# Patient Record
Sex: MALE | Race: BLACK OR AFRICAN AMERICAN | Employment: UNEMPLOYED
[De-identification: names, ages, dates, MRNs, and addresses within clinical notes are randomized per-mention and may not be internally consistent; named-entity substitution may affect disease eponyms.]

---

## 2023-10-27 ENCOUNTER — HOSPITAL ENCOUNTER (EMERGENCY)
Facility: HOSPITAL | Age: 28
Discharge: HOME | End: 2023-10-27

## 2023-10-27 VITALS
DIASTOLIC BLOOD PRESSURE: 102 MMHG | RESPIRATION RATE: 16 BRPM | BODY MASS INDEX: 38.57 KG/M2 | TEMPERATURE: 97.7 F | WEIGHT: 240 LBS | HEIGHT: 66 IN | OXYGEN SATURATION: 98 % | SYSTOLIC BLOOD PRESSURE: 164 MMHG | HEART RATE: 100 BPM

## 2023-10-27 DIAGNOSIS — H66.92 ACUTE OTITIS MEDIA WITH PERFORATED TYMPANIC MEMBRANE, LEFT: Primary | ICD-10-CM

## 2023-10-27 DIAGNOSIS — H72.92 ACUTE OTITIS MEDIA WITH PERFORATED TYMPANIC MEMBRANE, LEFT: Primary | ICD-10-CM

## 2023-10-27 PROCEDURE — 99283 EMERGENCY DEPT VISIT LOW MDM: CPT

## 2023-10-27 RX ORDER — AMOXICILLIN AND CLAVULANATE POTASSIUM 875; 125 MG/1; MG/1
1 TABLET, FILM COATED ORAL 2 TIMES DAILY
Qty: 20 TABLET | Refills: 0 | Status: SHIPPED | OUTPATIENT
Start: 2023-10-27 | End: 2023-11-06

## 2023-10-27 RX ORDER — AMOXICILLIN AND CLAVULANATE POTASSIUM 875; 125 MG/1; MG/1
1 TABLET, FILM COATED ORAL
Status: COMPLETED | OUTPATIENT
Start: 2023-10-27 | End: 2023-10-27

## 2023-10-27 RX ADMIN — AMOXICILLIN AND CLAVULANATE POTASSIUM 1 TABLET: 875; 125 TABLET, FILM COATED ORAL at 20:41

## 2023-10-27 ASSESSMENT — PAIN DESCRIPTION - ORIENTATION: ORIENTATION: LEFT

## 2023-10-27 ASSESSMENT — PAIN SCALES - GENERAL: PAINLEVEL_OUTOF10: 7

## 2023-10-27 ASSESSMENT — ENCOUNTER SYMPTOMS
COUGH: 0
DIARRHEA: 0
NAUSEA: 0
CONSTIPATION: 0
VOMITING: 0
ABDOMINAL PAIN: 0
CHEST TIGHTNESS: 0
SHORTNESS OF BREATH: 0

## 2023-10-27 ASSESSMENT — PAIN - FUNCTIONAL ASSESSMENT: PAIN_FUNCTIONAL_ASSESSMENT: 0-10

## 2023-10-27 ASSESSMENT — PAIN DESCRIPTION - LOCATION: LOCATION: EAR

## 2023-10-28 NOTE — ED NOTES
Patient D/C in stable condition wih all belongings. Patient leaves with  wife & 2 small children . No questions nor concern, NAD. RN offers to take patient to private room to review D/C papers for privacy but patient reports wanting to stay in [address], RN obliged. Patient provdes understanding of D/C instructions by Verbalized understanding and Repeated back wife also verbalizes understanding.         Radha Romero  10/27/23 2053

## 2023-10-28 NOTE — DISCHARGE INSTRUCTIONS
Begin taking Augmentin twice a day for 10 days. Keep all water or any other fluids out of your ear. Please follow-up with Dr. Magy Joy ENT for further evaluation. Please follow-up with PCP within the next 2 days in order to be reevaluated. If you not have a PCP, please contact [address] [place] [address]. Return to the ED with any new or worsening symptoms.